# Patient Record
Sex: FEMALE | Race: WHITE
[De-identification: names, ages, dates, MRNs, and addresses within clinical notes are randomized per-mention and may not be internally consistent; named-entity substitution may affect disease eponyms.]

---

## 2017-01-18 ENCOUNTER — HOSPITAL ENCOUNTER (OUTPATIENT)
Dept: HOSPITAL 25 - OREAST | Age: 72
Discharge: HOME | End: 2017-01-18
Attending: SPECIALIST
Payer: MEDICARE

## 2017-01-18 VITALS — DIASTOLIC BLOOD PRESSURE: 56 MMHG | SYSTOLIC BLOOD PRESSURE: 139 MMHG

## 2017-01-18 DIAGNOSIS — H25.12: Primary | ICD-10-CM

## 2017-01-18 DIAGNOSIS — E78.5: ICD-10-CM

## 2017-01-18 DIAGNOSIS — I10: ICD-10-CM

## 2017-01-18 PROCEDURE — V2632 POST CHMBR INTRAOCULAR LENS: HCPCS

## 2017-01-18 NOTE — OP
OPERATIVE NOTE:

 

DATE OF OPERATION:  01/18/17.

 

DATE OF BIRTH:  [birth year].

 

SURGEON:  Kelton Mcconnell M.D.

 

PREOPERATIVE DIAGNOSIS:  Cataract, left eye.

 

POSTOPERATIVE DIAGNOSIS:  Cataract, left eye.

 

OPERATIVE PROCEDURE:  Phacoemulsification, left eye with IOL.

 

PROCEDURE:  The patient was brought to the operating room after being given 1/2
% Alcaine with epinephrine drops in the preoperative area.  The eye was prepped 
and draped in the usual sterile fashion.  Sterile drape and eyelid speculum 
were placed.  Again, topical 1/2% Alcaine with epinephrine was given.  A 
paracentesis incision was made at the 3 o'clock position with the No.75 blade.  
Clear cornea incision 2.2 x 2.2-mm was created at the 6 o'clock position 
starting at the anterior limbus using the 2.2-mm keratome.  The anterior 
chamber was irrigated with 0.4 mL of 1% non-preservative intracameral lidocaine 
and filled with DisCoVisc.  A capsulorrhexis was completed using the cystotome 
and the Utrata forceps. Hydrodissection was performed with balanced salt 
solution.  The lens nucleus was removed with the Phacoemulsification handpiece 
without incident.  Cortex was removed with the irrigation-aspiration handpiece.
  The capsular bag was re-inflated using DisCoVisc and an SN60WF 19.5 diopter 
implant was inserted with the shooter . A Malyugin ring was used to dilate the 
pupil prior to capsulorrhexis, removed after inserted of the lens.  Indication 
for complex cataract surgery _____iris_ abnormalities requiring pupil dilation 
device. The irrigation-aspiration handpiece was used to remove all residual 
DisCoVisc.  The eye was refilled with balanced salt solution and the wound 
checked and found to be watertight.  Topical Maxitrol drops were given.

 

 [id number]/CPS #: [id number]

[facility]ORINA

## 2018-10-01 NOTE — HP
HISTORY AND PHYSICAL:

 

DATE OF ADMISSION/SURGERY:  10/04/18

 

DATE OF OFFICE VISIT:  09/28/18

 

SURGEON:  Randi Fay MD * (DICTATED BY BRIANNA RIGGS)

 

PROCEDURE:  Left total knee arthroplasty.

 

CHIEF COMPLAINT:  Left knee pain.

 

HISTORY OF PRESENT ILLNESS:  Ms. Mahmood is a 73-year-old female with continued 
complaints of left knee pain.  She has failed conservative treatment and 
elected to proceed with a left total knee arthroplasty.

 

PAST MEDICAL HISTORY:  High cholesterol, GERD, and melanoma.

 

PAST SURGICAL HISTORY:  Right total knee arthroplasty, appendectomy, ileostomy 
with subsequent reversal, wisdom teeth extraction, and tonsillectomy.

 

CURRENT MEDICATIONS:

1.  Nexium 40 mg daily.

2.  TriCor 145 mg daily.

3.  Phentermine 37.5 mg daily.

 

ALLERGIES:  To IBUPROFEN, causing rash and facial swelling.

 

FAMILY HISTORY:  Denies.

 

SOCIAL HISTORY:  She is a 73-year-old female.  She lives alone.  She does not 
smoke, use drugs, or alcohol.

 

REVIEW OF SYSTEMS:  A complete 14-point review of systems was reviewed with the 
patient, was positive for GERD.  She denies history of DVT, PE, hepatitis, HIV, 
or anesthesia problems.

 

                               PHYSICAL EXAMINATION

 

GENERAL:  She is well developed, well nourished, in no acute distress.

 

VITAL SIGNS:  She stands 62 inches tall, weighs 240 pounds.  Her blood pressure 
is 124/72 and her heart rate is 76.

 

HEENT:  Normocephalic, atraumatic.

 

NECK:  Supple.  No palpable lymph nodes.

 

PULMONARY:  Lungs are clear to auscultation bilaterally.

 

CARDIO:  Regular rate rhythm.  Strong S1 and S2.

 

ABDOMEN:  Soft, nontender, nondistended.

 

NEUROLOGIC:  She is alert and oriented x3.

 

MUSCULOSKELETAL:  Left lower extremity, the skin is intact.  There are no open 
wounds or abrasions.  There is moderate joint effusion.  She has tenderness 
over the medial and lateral joint line.  She walks with an antalgic-type gait 
favoring her left knee.  Range of motion is 10 to 100 degrees of flexion with 
patellofemoral crepitus.  She has 2+ dorsalis pedis pulse, intact sensation.  
Her lower extremity muscle group strengths are intact at 5/5.

 

 ASSESSMENT AND PLAN:  Ms. Mahmood is a 73-year-old female with end-stage 
osteoarthritis to the left knee.  She has failed conservative treatment and 
elected to proceed with a left total knee arthroplasty.  The surgery is 
scheduled for 10/04/18 with Dr. Fay.  Dr. Fay has discussed the risks and 
benefits of the surgery at today's visit and all of her questions were 
answered.  She will follow up with Dr. Fay 2 weeks after the surgery.

 

 ____________________________________ BRIANNA RIGGS

 

[id number]/[facility] #: [id number]

[facility]DDORINA

## 2018-10-04 ENCOUNTER — HOSPITAL ENCOUNTER (INPATIENT)
Dept: HOSPITAL 25 - AA | Age: 73
LOS: 3 days | Discharge: HOME HEALTH | DRG: 470 | End: 2018-10-07
Attending: ORTHOPAEDIC SURGERY | Admitting: ORTHOPAEDIC SURGERY
Payer: MEDICARE

## 2018-10-04 DIAGNOSIS — E78.5: ICD-10-CM

## 2018-10-04 DIAGNOSIS — M48.061: ICD-10-CM

## 2018-10-04 DIAGNOSIS — Z88.8: ICD-10-CM

## 2018-10-04 DIAGNOSIS — Z79.01: ICD-10-CM

## 2018-10-04 DIAGNOSIS — M54.16: ICD-10-CM

## 2018-10-04 DIAGNOSIS — K21.9: ICD-10-CM

## 2018-10-04 DIAGNOSIS — M25.762: ICD-10-CM

## 2018-10-04 DIAGNOSIS — Z96.651: ICD-10-CM

## 2018-10-04 DIAGNOSIS — R26.89: ICD-10-CM

## 2018-10-04 DIAGNOSIS — Z93.2: ICD-10-CM

## 2018-10-04 DIAGNOSIS — E78.00: ICD-10-CM

## 2018-10-04 DIAGNOSIS — Z23: ICD-10-CM

## 2018-10-04 DIAGNOSIS — Z87.891: ICD-10-CM

## 2018-10-04 DIAGNOSIS — M85.862: ICD-10-CM

## 2018-10-04 DIAGNOSIS — M25.462: ICD-10-CM

## 2018-10-04 DIAGNOSIS — J45.909: ICD-10-CM

## 2018-10-04 DIAGNOSIS — E78.2: ICD-10-CM

## 2018-10-04 DIAGNOSIS — E66.9: ICD-10-CM

## 2018-10-04 DIAGNOSIS — Z85.820: ICD-10-CM

## 2018-10-04 DIAGNOSIS — Z91.81: ICD-10-CM

## 2018-10-04 DIAGNOSIS — M19.042: ICD-10-CM

## 2018-10-04 DIAGNOSIS — M17.12: Primary | ICD-10-CM

## 2018-10-04 PROCEDURE — 0SRD0J9 REPLACEMENT OF LEFT KNEE JOINT WITH SYNTHETIC SUBSTITUTE, CEMENTED, OPEN APPROACH: ICD-10-PCS | Performed by: ORTHOPAEDIC SURGERY

## 2018-10-04 PROCEDURE — 85049 AUTOMATED PLATELET COUNT: CPT

## 2018-10-04 PROCEDURE — 80048 BASIC METABOLIC PNL TOTAL CA: CPT

## 2018-10-04 PROCEDURE — 36415 COLL VENOUS BLD VENIPUNCTURE: CPT

## 2018-10-04 PROCEDURE — C1776 JOINT DEVICE (IMPLANTABLE): HCPCS

## 2018-10-04 PROCEDURE — 85610 PROTHROMBIN TIME: CPT

## 2018-10-04 PROCEDURE — 87641 MR-STAPH DNA AMP PROBE: CPT

## 2018-10-04 PROCEDURE — 85014 HEMATOCRIT: CPT

## 2018-10-04 PROCEDURE — 85018 HEMOGLOBIN: CPT

## 2018-10-04 PROCEDURE — 90686 IIV4 VACC NO PRSV 0.5 ML IM: CPT

## 2018-10-04 RX ADMIN — CEFAZOLIN SODIUM SCH MLS/HR: 1 SOLUTION INTRAVENOUS at 22:00

## 2018-10-04 RX ADMIN — TRAMADOL HYDROCHLORIDE PRN MG: 50 TABLET, FILM COATED ORAL at 23:25

## 2018-10-04 RX ADMIN — OXYCODONE HYDROCHLORIDE AND ACETAMINOPHEN PRN TAB: 5; 325 TABLET ORAL at 17:07

## 2018-10-04 RX ADMIN — FENTANYL CITRATE PRN MCG: 0.05 INJECTION, SOLUTION INTRAMUSCULAR; INTRAVENOUS at 10:26

## 2018-10-04 RX ADMIN — MORPHINE SULFATE PRN MG: 4 INJECTION INTRAVENOUS at 23:22

## 2018-10-04 RX ADMIN — CEFAZOLIN SODIUM SCH MLS/HR: 1 SOLUTION INTRAVENOUS at 22:05

## 2018-10-04 RX ADMIN — OXYCODONE HYDROCHLORIDE AND ACETAMINOPHEN PRN TAB: 5; 325 TABLET ORAL at 22:08

## 2018-10-04 RX ADMIN — OXYCODONE HYDROCHLORIDE PRN MG: 5 CAPSULE ORAL at 20:12

## 2018-10-04 RX ADMIN — MAGNESIUM HYDROXIDE SCH ML: 400 SUSPENSION ORAL at 22:08

## 2018-10-04 RX ADMIN — CEFAZOLIN SODIUM SCH MLS/HR: 1 SOLUTION INTRAVENOUS at 22:15

## 2018-10-04 RX ADMIN — FENTANYL CITRATE PRN MCG: 0.05 INJECTION, SOLUTION INTRAMUSCULAR; INTRAVENOUS at 10:35

## 2018-10-04 RX ADMIN — OXYCODONE HYDROCHLORIDE AND ACETAMINOPHEN PRN TAB: 5; 325 TABLET ORAL at 16:24

## 2018-10-04 RX ADMIN — CEFAZOLIN SODIUM SCH: 1 SOLUTION INTRAVENOUS at 22:15

## 2018-10-04 RX ADMIN — ACETAMINOPHEN SCH: 325 TABLET ORAL at 22:49

## 2018-10-04 RX ADMIN — DOCUSATE SODIUM SCH MG: 100 CAPSULE, LIQUID FILLED ORAL at 22:08

## 2018-10-04 NOTE — XMS REPORT
Continuity of Care Document (CCD)

 Created on:2018



Patient:Zoraida Mahmood

Sex:Female

:1945

External Reference #:[id number]





Demographics







 Address  [address] [place] [address]

 

 Home Phone  [phone]

 

 Mobile Phone  [phone]

 

 Work Phone  [phone]

 

 Preferred Language  en

 

 Marital Status  Not  or 

 

 Latter-day Affiliation  Unknown

 

 Race  White

 

 Ethnic Group  Not  or 









Author







 Name  Kelton Mcconnell M.D.

 

 Address  [address] [place] [address]









Support







 Name  Relationship  Address  Phone

 

 Carlos Healy  Grandchild  Unavailable  [phone]









Care Team Providers







 Name  Role  Phone

 

 Sheryl Leigh M.D.  Primary Care Physician  Unavailable









Payers







 Type  Date  Identification Numbers  Payment Provider  Subscriber

 

     Policy Number: [id number]  Medicare - [facility]  Zoraida Mahmood









 PayID: [id number]  [address]









 [place] [address]









     Policy Number: [id number]  [facility]  Zoraida Mahmood









 Group Number: [id number]  [id number]

 

 PayID: [id number]  [place] [address]







Advance Directives







 Description

 

 No Information Available







Problems







 Date  Description  Provider  Status

 

 Onset:   Gastroesophageal reflux disease    Active

 

 Onset:   Low blood pressure    Active

 

 Onset:   Arthritis    Active

 

 Onset: 2018  Other secondary cataract, left eye  Kelton Mcconnell M.D.  
Active

 

 Onset: 07/10/2015  Pseudophakia  Kelton Mcconnell M.D.  Active

 

 Onset: 07/10/2015  Vitreous degeneration  Kelton Mcconnell M.D.  Active

 

 Onset: 07/10/2015  Retinal edema  Kelton Mcconnell M.D.  Active

 

 Onset: 07/10/2015  Nuclear senile cataract  Kelton Mcconnell M.D.  Active







Family History







 Date  Family Member(s)  Problem(s)  Comments

 

   Father  No Current Problems  

 

   Mother  No Current Problems  







Social History







 Type  Date  Description  Comments

 

 Birth Sex    Unknown  

 

 Marital Status    Legal Status:   

 

 Occupation       and



       Construction for [employer]

 

 Work Status    Retired  

 

 ETOH Use    Occasionally consumes  



     alcohol  

 

 Tobacco Use  Start: Unknown  Patient has never  



     smoked  

 

 Recreational Drug Use    Denies Drug Use  

 

 Smoking Status  Reviewed: 18  Patient has never  



     smoked  







Allergies, Adverse Reactions, Alerts







 Date  Description  Reaction  Status  Severity  Comments

 

 07/10/2015  Ibuprofen  Allergic asthma, Urticaria  Active    







Medications







 Medication  Date  Status  Form  Strength  Qnty  SIG  Indications  Ordering



                 Provider

 

 Phentermine HCL  /  Active  Tablets  37.5mg        Crepet,



   0000              Sheryl M.D.

 

 Hydrocodone-Ace  /  Active  Tablets  7.5-325mg        Crepet,



 taminophen  0000              Sheryl M.D.

 

 Fenofibrate  /  Active  Tablets  145mg        Unknown



   0000              

 

 Nexium  /  Active  Capsules DR  40mg        Unknown



   0000              

 

                 

 

 Ciprofloxacin  /  Hx  Solution  0.2%  14uni  1 drop    Kelton J.



 HCL   -        ts  left eye    Arleo,



   /          three    M.D.



   2018          times a    



             day    

 

 Prednisolone  /  Hx  Suspension  1%  15uni  1 drops    Kelton J.



 Acetate   -          left eye    Arleo,



             three    M.D.



   2018          times a    



             day.    



             taper as    



             directed    







Immunizations







 Description

 

 No Information Available







Vital Signs







 Description

 

 No Information Available







Results







 Description

 

 No Information Available







Procedures







 Date  Code  Description  Status

 

 2017  47423  Extracapsular Cataract Extraction W/Intraocular Lens  
Completed

 

 2017  15630  Ophthalmic Biometry  Completed

 

 2016  71903  Est Patient Comprehensive Exam  Completed

 

 07/10/2015  00946  Est Patient Comprehensive Exam  Completed

 

 2014  99358  Est Patient Comprehensive Exam  Completed

 

 11/15/2011  05591  Determination Of Refractive State  Completed

 

 11/15/2011  74548  Est Patient Comprehensive Exam  Completed







Encounters







 Type  Date  Location  Provider  Dx  Diagnosis

 

 Office Visit  2017  Kelton Turner,  H25.12  Age-
related



   10:15a  MD, renee SIMPSON    nuclear cataract,



           left eye









 Z96.1  Presence of intraocular lens







Plan of Treatment

2018 - Kelton Mcconnell M.D.H26.492 Other secondary cataract, left 
eyeComments:Smoking can increase the risk of developing or worsening any eye 
related disease, as well as affect your overall health.  If you are a smoker, 
we strongly recommend that you quit.If you are not a smoker, we strongly 
recommend that you do not start.  There is clouding in the sac that holds your 
artificial lens in your right eye. We will schedule you an appointment for the 
YAG Capsulotomy laser with Dr. Mcconnell.  Please read the pamphlet that has been 
printed out for you.  We recommend you bring someoneto drive you home.Follow up:
Schedule YAG OD  FIRST THING THURSDAY AMZ96.1 Presence of intraocular 
lensComments:The artificial lens implants in both eyes appear to be stable at 
this time.H35.81 Retinal edema

## 2018-10-04 NOTE — XMS REPORT
Zoraida Olivarez

 Created on:2018



Patient:Zoraida Olivarez

Sex:Female

:1945

External Reference #:[id number]





Demographics







 Address  [address] [place] [address]

 

 Home Phone  [phone]

 

 Mobile Phone  [phone]

 

 Email Address  [email or web address]

 

 Preferred Language  English

 

 Marital Status  Not  Or 

 

 [employer] Affiliation  Unknown

 

 Race  White

 

 Ethnic Group  Not  Or 









Author







 Organization  [facility]

 

 Address  [address]



   [place] [address]

 

 Phone  [phone]









Support







 Name  Relationship  Address  Phone

 

 Carlos Healy  Unavailable  Unavailable  [phone]









Care Team Providers







 Name  Role  Phone

 

 Yuan Henley MD  Care Team Information   Unavailable

 

 Sheryl Leigh MD  Primary Care Physician  Unavailable









Payers







 Type  Date  Identification Numbers  Payment Provider  Subscriber

 

 Medicare Primary    Policy Number: [id number]  Medicare  Zoraida Timoteo









 PayID: [id number]  [address]









 [place] [address]









 Medigap Part B    Policy Number: [id number]  [employer]  Zoraida Timoteo









 PayID: [id number]  [address]









 [place] [address]









 Medigap Part B  Expires: 2018  Policy Number: [id number]  [facility]  Zoraida Timoteo









 PayID: [id number]  [address]









 [place] [address]







Problems







 Date  Description  Provider  Status

 

 Onset: 2018  Localized, primary osteoarthritis  Randi Fay M.D.  
Active







Family History







 Date  Family Member(s)  Problem(s)  Comments

 

   General  No Current Problems  







Social History







 Type  Date  Description  Comments

 

 Lives With    Alone  

 

 Occupation    Retired  

 

 ETOH Use    Rarely consumes alcohol  

 

 Smoking    Patient is a former smoker  quit 

 

 Exercise Type/Frequency    Exercises sporadically  







Allergies, Adverse Reactions, Alerts







 Date  Description  Reaction  Status  Severity  Comments

 

 2017  Ibuprofen    active    







Medications







 Medication  Date  Status  Form  Strength  Qnty  SIG  Indications  Ordering



                 Provider

 

 Nexium    Active  Capsules DR  40mg    1 by    Unknown



   000          mouth    



             every day    

 

 Tricor    Active  Tablets  145mg    1 by    Unknown



   000          mouth    



             every day    

 

 Phentermine  00/00/0  Active  Tablets  37.5mg        Crepet,



 HCL  000              MD Sheryl

 

                 

 

 Hydrocodone  0  Hx  Tablets  7.5-300mg    1 tab by    Unknown



 Bitartrate/Ace  000 -          mouth    



 taminophen            every    



   018          8-12    



             hours as    



             needed    







Medications Administered in Office







 Medication  Date  Status  Form  Strength  Qnty  SIG  Indications  Ordering



                 Provider

 

 Depomedrol    Administered  Injection          Alethea



 40MG  Bianca Santos M.D.







Vital Signs







 Date  Vital  Result  Comment

 

 2018  Height  62.5 inches  5'2.50"









 Weight  242.00 lb  

 

 BP Systolic  127 mmHg  

 

 BP Diastolic  74 mmHg  

 

 Respiratory Rate  16 /min  

 

 Pain Level  7  

 

 BMI (Body Mass Index)  43.6 kg/m2  









 2017  Height  62.5 inches  5'2.50"









 Weight  226.00 lb  

 

 Heart Rate  80 /min  

 

 BP Systolic Sitting  142 mmHg  

 

 BP Diastolic Sitting  70 mmHg  

 

 Respiratory Rate  14 /min  

 

 Body Temperature  98.2 F  

 

 Pain Level  8  

 

 BMI (Body Mass Index)  40.7 kg/m2  







Results







 Test  Date  Test  Result  H/L  Range  Note

 

 CBC Auto Diff  2018  White Blood Count  7.8 10^3/uL    3.5-10.8  









 Red Blood Count  4.05 10^6/uL    4.00-5.40  

 

 Hemoglobin  13.3 g/dL    12.0-16.0  

 

 Hematocrit  40 %    35-47  

 

 Mean Corpuscular Volume  98 fL  High  80-97  

 

 Mean Corpuscular Hemoglobin  33 pg  High  27-31  

 

 Mean Corpuscular HGB Conc  34 g/dL    31-36  

 

 Red Cell Distribution Width  13 %    10.5-15  

 

 Platelet Count  269 10^3/uL    150-450  

 

 Mean Platelet Volume  8.2 um3    7.4-10.4  

 

 Abs Neutrophils  5.1 10^3/uL    1.5-7.7  

 

 Abs Lymphocytes  2.0 10^3/uL    1.0-4.8  

 

 Abs Monocytes  0.5 10^3/uL    0-0.8  

 

 Abs Eosinophils  0.2 10^3/uL    0-0.6  

 

 Abs Basophils  0.1 10^3/uL    0-0.2  

 

 Abs Nucleated RBC  0 10^3/uL      

 

 Granulocyte %  65.1 %    38-83  

 

 Lymphocyte %  25.4 %    25-47  

 

 Monocyte %  6.6 %    0-7  

 

 Eosinophil %  2.0 %    0-6  

 

 Basophil %  0.9 %    0-2  

 

 Nucleated Red Blood Cells %  0.1      









 Inr/Protime  2018  Inr  0.94    0.77-1.02  

 

 Laboratory test finding  2018  Partial Thrombo Time  31.9 seconds    26.0
-36.3  



     PTT        

 

 Comp Metabolic Panel  2018  Sodium  139 mmol/L    135-145  









 Potassium  3.9 mmol/L    3.5-5.0  

 

 Chloride  103 mmol/L    101-111  

 

 Co2 Carbon Dioxide  28 mmol/L    22-32  

 

 Anion Gap  8 mmol/L    2-11  

 

 Glucose  116 mg/dL  High    

 

 Blood Urea Nitrogen  26 mg/dL  High  6-24  

 

 Creatinine  1.03 mg/dL  High  0.51-0.95  

 

 BUN/Creatinine Ratio  25.2  High  8-20  

 

 Calcium  9.6 mg/dL    8.6-10.3  

 

 Total Protein  6.7 g/dL    6.4-8.9  

 

 Albumin  3.8 g/dL    3.2-5.2  

 

 Globulin  2.9 g/dL    2-4  

 

 Albumin/Globulin Ratio  1.3    1-3  

 

 Total Bilirubin  0.40 mg/dL    0.2-1.0  

 

 Alkaline Phosphatase  57 U/L      

 

 Alt  15 U/L    7-52  

 

 Ast  18 U/L    13-39  

 

 Egfr Non-  52.4    >60  

 

 Egfr   63.4    >60  1









 Type & Screen  2018  Patient Blood Type  B Positive      









 Antibody Screen  NEGATIVE      









 Urinalysis Profile  2018  Urine Color  Yellow      









 Urine Appearance  Clear      

 

 Urine Specific Gravity  1.035  High  1.010-1.030  

 

 Urine pH  5    5-9  

 

 Urine Urobilinogen  Negative    Negative  

 

 Urine Ketones  Negative    Negative  

 

 Urine Protein  Negative    Negative  

 

 Urine Leukocytes  2+    Negative  

 

 Urine Blood  Negative    Negative  

 

 Urine Nitrite  Negative    Negative  

 

 Urine Bilirubin  Negative    Negative  

 

 Urine Glucose  Negative    Negative  

 

 Urine White Blood Cell  3+(>20/hpf)    Absent  

 

 Urine Red Blood Cell  2+(6-10/hpf)    Absent  

 

 Urine Bacteria  Absent    Absent  

 

 Urine Squamous Epithelial Cell  Present    Absent  









 Urine Culture And Sensitivities  2018  Urine Culture  SEE RESULT BELOW  
    2









 1  *******Because ethnic data is not always readily available,



   this report includes an eGFR for both -Americans and



   non- Americans.****



   The National Kidney Disease Education Program (NKDEP) does



   not endorse the use of the MDRD equation for patients that



   are not between the ages of 18 and 70, are pregnant, have



   extremes of body size, muscle mass, or nutritional status,



   or are non- or non-.



   According to the National Kidney Foundation, irrespective of



   diagnosis, the stage of the disease is based on the level of



   kidney function:



   Stage Description                      GFR(mL/min/1.73 m(2))



   1     Kidney damage with normal or decreased GFR       90



   2     Kidney damage with mild decrease in GFR          60-89



   3     Moderate decrease in GFR                         30-59



   4     Severe decrease in GFR                           15-29



   5     Kidney failure                       <15 (or dialysis)

 

 2  SEE RESULT BELOW



   -----------------------------------------------------------------------------
---------------



   Name:  ZORAIDA OLIVAREZ                     : 1945    Attend Dr: Randi Fay MD



   Acct:  [id number]  Unit: [id number]  AGE: 73            Location:  [facility]



   Re18                        SEX: F             Status:    REG REF



   -----------------------------------------------------------------------------
---------------



   



   SPEC: [id number]         ARSALAN:       [facility] DR: Randi Fay MD



   REQ:  [id number]              RECD:   



   STATUS: COMP



   _



   SOURCE: URINE          SPDESC:



   ORDERED:  Urine Culture



   QUERIES:  Urine Source: Clean Catch



   



   -----------------------------------------------------------------------------
---------------



   Procedure                         Result                         Reported   
        Site



   -----------------------------------------------------------------------------
---------------



   Urine Culture  Final                                             18-
1630      ML



   



   No growth of clinically significant organisms



   



   -----------------------------------------------------------------------------
---------------



   * ML - Main Lab



   .



   



   



   



   



   



   



   



   



   



   



   



   



   



   



   



   



   



   



   



   



   



   



   



   



   



   ** END OF REPORT **



   



   DEPARTMENT OF PATHOLOGY,  [address] [place] [address]



   Phone # [phone]      Fax #[phone]



   Ramón Troy M.D. Director     [facility] # [id number]







Procedures







 Date  CPT Code  Description  Status

 

 2017  06438  Inject/Drain Joint/Bursa Small W/O US  Completed







Encounters







 Type  Date  Location  Provider  CPT E/M  Dx

 

 Office Visit  2018  [facility]  Randi Fay M.D.  [id number]  
M25.562



   10:15a  [facility] [place]      









 M25.462

 

 M17.12









 Office Visit  2017  9:30a  [facility]  Alethea Santos,  [id number]  
M18.12



     [facility] [place]    







Plan of Care

Future Appointment(s):2018  9:00 am - Randi F[facility] M.D. [facility]  3:45 pm - Henry Daniels PA-C [facility]  3:45 pm - BRIANNA Carvajal at [facility]2018  3:45 pm - Randi Fay M.D. at [facility] [place] - Randi Fay M.D.M25.562 Pain in left 
kneeFollow up:Follow up:   7-10 days before ewyigjaX02.462 Effusion, left 
kneeM17.12 Unilateral primary osteoarthritis, left knee

## 2018-10-04 NOTE — RAD
HISTORY: s/p left TKA



COMPARISONS: September 07, 2018 



VIEWS: 3 , Frontal and lateral views of the left knee 



FINDINGS:



BONE DENSITY: Normal.

BONES: The patient is status post left knee  arthroplasty. There is no hardware failure or

osteolysis.

JOINTS: The patient is status post left knee arthroplasty. 

ALIGNMENT: There is no dislocation. 

SOFT TISSUES: There is postsurgical change to the soft tissues. 



OTHER FINDINGS: None.



IMPRESSION: 

STATUS POST LEFT KNEE ARTHROPLASTY

## 2018-10-04 NOTE — XMS REPORT
Zoraida Olivarez

 Created on:2018



Patient:Zoraida Olivarez

Sex:Female

:1945

External Reference #:[id number]





Demographics







 Address  [address] [place] [address]

 

 Home Phone  [phone]

 

 Mobile Phone  [phone]

 

 Email Address  [email or web address]

 

 Preferred Language  English

 

 Marital Status  Not  Or 

 

 Advent Affiliation  Unknown

 

 Race  White

 

 Ethnic Group  Not  Or 









Author







 Organization  [employer]

 

 Address  [address]



   [place] [address]

 

 Phone  [phone]









Support







 Name  Relationship  Address  Phone

 

 Carlos Healy  Unavailable  Unavailable  [phone]









Care Team Providers







 Name  Role  Phone

 

 Yuan Henley MD  Care Team Information   Unavailable

 

 Sheryl Leigh MD  Primary Care Physician  Unavailable









Payers







 Type  Date  Identification Numbers  Payment Provider  Subscriber

 

 Medicare Primary    Policy Number: [id number]  Medicare  Zoraida Timoteo









 PayID: [id number]  [address]









 [place] [address]









 Medigap Part B    Policy Number: [id number]  [employer]  Zoraida Timoteo









 PayID: [id number]  [address]









 [place] [address]









 Medigap Part B  Expires: 2018  Policy Number: [id number]  [facility]  Zoraida Timoteo









 PayID: [id number]  [address]









 [place] [address]







Problems







 Date  Description  Provider  Status

 

 Onset: 2018  Localized, primary osteoarthritis  Randi Fay M.D.  
Active







Family History







 Date  Family Member(s)  Problem(s)  Comments

 

   General  No Current Problems  







Social History







 Type  Date  Description  Comments

 

 Lives With    Alone  

 

 Occupation    Retired  

 

 ETOH Use    Rarely consumes alcohol  

 

 Smoking    Patient is a former smoker  quit 

 

 Exercise Type/Frequency    Exercises sporadically  







Allergies, Adverse Reactions, Alerts







 Date  Description  Reaction  Status  Severity  Comments

 

 2017  Ibuprofen    active    

 

 2018  Strawberries    active    







Medications







 Medication  Date  Status  Form  Strength  Qnty  SIG  Indications  Ordering



                 Provider

 

 Nexium    Active  Capsules DR  40mg    1 by    Unknown



   000          mouth    



             every day    

 

 Tricor    Active  Tablets  145mg    1 by    Unknown



   000          mouth    



             every day    

 

 Phentermine  0  Active  Tablets  37.5mg        Crepet,



 HCL  000              MD Sheryl

 

                 

 

 Hydrocodone    Hx  Tablets  7.5-300mg    1 tab by    Unknown



 Bitartrate/Ace  000 -          mouth    



 taminophen            every    



   018          8-12    



             hours as    



             needed    







Medications Administered in Office







 Medication  Date  Status  Form  Strength  Qnty  SIG  Indications  Ordering



                 Provider

 

 Depomedrol    Administered  Injection          Alethea



 40MG  Bianca Santos M.D.







Vital Signs







 Date  Vital  Result  Comment

 

 2018  Height  62.5 inches  5'2.50"









 Weight  241.00 lb  

 

 BP Systolic  124 mmHg  

 

 BP Diastolic  72 mmHg  

 

 Respiratory Rate  15 /min  

 

 Pain Level  6  

 

 BMI (Body Mass Index)  43.4 kg/m2  









 2018  Height  62.5 inches  5'2.50"









 Weight  242.00 lb  

 

 BP Systolic  127 mmHg  

 

 BP Diastolic  74 mmHg  

 

 Respiratory Rate  16 /min  

 

 Pain Level  7  

 

 BMI (Body Mass Index)  43.6 kg/m2  









 2017  Height  62.5 inches  5'2.50"









 Weight  226.00 lb  

 

 Heart Rate  80 /min  

 

 BP Systolic Sitting  142 mmHg  

 

 BP Diastolic Sitting  70 mmHg  

 

 Respiratory Rate  14 /min  

 

 Body Temperature  98.2 F  

 

 Pain Level  8  

 

 BMI (Body Mass Index)  40.7 kg/m2  







Results







 Test  Date  Test  Result  H/L  Range  Note

 

 CBC Auto Diff  2018  White Blood Count  7.8 10^3/uL    3.5-10.8  









 Red Blood Count  4.05 10^6/uL    4.00-5.40  

 

 Hemoglobin  13.3 g/dL    12.0-16.0  

 

 Hematocrit  40 %    35-47  

 

 Mean Corpuscular Volume  98 fL  High  80-97  

 

 Mean Corpuscular Hemoglobin  33 pg  High  27-31  

 

 Mean Corpuscular HGB Conc  34 g/dL    31-36  

 

 Red Cell Distribution Width  13 %    10.5-15  

 

 Platelet Count  269 10^3/uL    150-450  

 

 Mean Platelet Volume  8.2 um3    7.4-10.4  

 

 Abs Neutrophils  5.1 10^3/uL    1.5-7.7  

 

 Abs Lymphocytes  2.0 10^3/uL    1.0-4.8  

 

 Abs Monocytes  0.5 10^3/uL    0-0.8  

 

 Abs Eosinophils  0.2 10^3/uL    0-0.6  

 

 Abs Basophils  0.1 10^3/uL    0-0.2  

 

 Abs Nucleated RBC  0 10^3/uL      

 

 Granulocyte %  65.1 %    38-83  

 

 Lymphocyte %  25.4 %    25-47  

 

 Monocyte %  6.6 %    0-7  

 

 Eosinophil %  2.0 %    0-6  

 

 Basophil %  0.9 %    0-2  

 

 Nucleated Red Blood Cells %  0.1      









 Inr/Protime  2018  Inr  0.94    0.77-1.02  

 

 Laboratory test finding  2018  Partial Thrombo Time  31.9 seconds    26.0
-36.3  



     PTT        

 

 Comp Metabolic Panel  2018  Sodium  139 mmol/L    135-145  









 Potassium  3.9 mmol/L    3.5-5.0  

 

 Chloride  103 mmol/L    101-111  

 

 Co2 Carbon Dioxide  28 mmol/L    22-32  

 

 Anion Gap  8 mmol/L    2-11  

 

 Glucose  116 mg/dL  High    

 

 Blood Urea Nitrogen  26 mg/dL  High  6-24  

 

 Creatinine  1.03 mg/dL  High  0.51-0.95  

 

 BUN/Creatinine Ratio  25.2  High  8-20  

 

 Calcium  9.6 mg/dL    8.6-10.3  

 

 Total Protein  6.7 g/dL    6.4-8.9  

 

 Albumin  3.8 g/dL    3.2-5.2  

 

 Globulin  2.9 g/dL    2-4  

 

 Albumin/Globulin Ratio  1.3    1-3  

 

 Total Bilirubin  0.40 mg/dL    0.2-1.0  

 

 Alkaline Phosphatase  57 U/L      

 

 Alt  15 U/L    7-52  

 

 Ast  18 U/L    13-39  

 

 Egfr Non-  52.4    >60  

 

 Egfr   63.4    >60  1









 Type & Screen  2018  Patient Blood Type  B Positive      









 Antibody Screen  NEGATIVE      









 Urinalysis Profile  2018  Urine Color  Yellow      









 Urine Appearance  Clear      

 

 Urine Specific Gravity  1.035  High  1.010-1.030  

 

 Urine pH  5    5-9  

 

 Urine Urobilinogen  Negative    Negative  

 

 Urine Ketones  Negative    Negative  

 

 Urine Protein  Negative    Negative  

 

 Urine Leukocytes  2+    Negative  

 

 Urine Blood  Negative    Negative  

 

 Urine Nitrite  Negative    Negative  

 

 Urine Bilirubin  Negative    Negative  

 

 Urine Glucose  Negative    Negative  

 

 Urine White Blood Cell  3+(>20/hpf)    Absent  

 

 Urine Red Blood Cell  2+(6-10/hpf)    Absent  

 

 Urine Bacteria  Absent    Absent  

 

 Urine Squamous Epithelial Cell  Present    Absent  









 Urine Culture And Sensitivities  2018  Urine Culture  SEE RESULT BELOW  
    2









 1  *******Because ethnic data is not always readily available,



   this report includes an eGFR for both -Americans and



   non- Americans.****



   The National Kidney Disease Education Program (NKDEP) does



   not endorse the use of the MDRD equation for patients that



   are not between the ages of 18 and 70, are pregnant, have



   extremes of body size, muscle mass, or nutritional status,



   or are non- or non-.



   According to the National Kidney Foundation, irrespective of



   diagnosis, the stage of the disease is based on the level of



   kidney function:



   Stage Description                      GFR(mL/min/1.73 m(2))



   1     Kidney damage with normal or decreased GFR       90



   2     Kidney damage with mild decrease in GFR          60-89



   3     Moderate decrease in GFR                         30-59



   4     Severe decrease in GFR                           15-29



   5     Kidney failure                       <15 (or dialysis)

 

 2  SEE RESULT BELOW



   -----------------------------------------------------------------------------
---------------



   Name:  ZORAIDA OLIVRAEZ                     : 1945    Attend Dr: Randi Fay MD



   Acct:  [id number]  Unit: [id number]  AGE: 73            Location:  [facility]



   Re18                        SEX: F             Status:    REG REF



   -----------------------------------------------------------------------------
---------------



   



   SPEC: [id number]         ARSALAN:       SUBM DR: Rnadi Fay MD



   REQ:  [id number]              RECD:   



   STATUS: COMP



   _



   SOURCE: URINE          SPDESC:



   ORDERED:  Urine Culture



   QUERIES:  Urine Source: Clean Catch



   



   -----------------------------------------------------------------------------
---------------



   Procedure                         Result                         Reported   
        Site



   -----------------------------------------------------------------------------
---------------



   Urine Culture  Final                                             18-
1630      ML



   



   No growth of clinically significant organisms



   



   -----------------------------------------------------------------------------
---------------



   * ML - Main Lab



   .



   



   



   



   



   



   



   



   



   



   



   



   



   



   



   



   



   



   



   



   



   



   



   



   



   



   ** END OF REPORT **



   



   DEPARTMENT OF PATHOLOGY,  [address] [place] [address]



   Phone # [phone]      Fax #[phone]



   Ramón Troy M.D. Director     [facility] # [id number]







Procedures







 Date  CPT Code  Description  Status

 

 2017  29694  Inject/Drain Joint/Bursa Small W/O US  Completed







Encounters







 Type  Date  Location  Provider  CPT E/M  Dx

 

 Office Visit  2018  [facility]  Randi Fay M.D.  [id number]  
M25.562



   10:15a  [facility] [place]      









 M25.462

 

 M17.12









 Office Visit  2017  9:30a  [facility]  Alethea Santos,  [address]  
M18.12



     [facility] [place]    







Plan of Care

Future Appointment(s):10/04/2018 11:45 am - Henry Daniels PA-C at [facility] [place]/04/2018 11:45 am - BRIANNA Carvajal at [facility].10/04/2018 11:45 am - Randi Fay M.D. at [facility]2018 - Randi Fay M.D.M25.562 Pain in left 
kneeFollow up:Follow up:   2 weeks after nvuqrttP22.462 Effusion, left 
kneeM17.12 Unilateral primary osteoarthritis, left knee

## 2018-10-04 NOTE — XMS REPORT
Zoraidaelvin Mahmood

 Created on:2018



Patient:Zoraida Mahmood

Sex:Female

:1945

External Reference #:[id number]





Demographics







 Address  [address] [place] [address]

 

 Home Phone  [phone]

 

 Mobile Phone  [phone]

 

 Email Address  [email or web address]

 

 Preferred Language  English

 

 Marital Status  Not  Or 

 

 Rastafari Affiliation  Unknown

 

 Race  White

 

 Ethnic Group  Not  Or 









Author







 Organization  [email or web address]

 

 Address  [address]



   [place] [address]

 

 Phone  [phone]









Support







 Name  Relationship  Address  Phone

 

 Carlos Healy  Unavailable  Unavailable  [phone]









Care Team Providers







 Name  Role  Phone

 

 Yuan Henley MD  Care Team Information   Unavailable

 

 Sheryl Leigh MD  Primary Care Physician  Unavailable









Payers







 Type  Date  Identification Numbers  Payment Provider  Subscriber

 

 Medicare Primary    Policy Number: [id number]  Medicare  Zoraida Timoteo









 PayID: [id number]   [address]









 [place] [address]









 [facility]    Policy Number: [id number]  [facility]  Zoraida Timoteo









 PayID: [id number]  [address]









 [place] [address]







Problems







 Date  Description  Provider  Status

 

 Onset: 2018  Localized, primary osteoarthritis  Randi Fay M.D.  
Active







Family History







 Date  Family Member(s)  Problem(s)  Comments

 

   General  No Current Problems  







Social History







 Type  Date  Description  Comments

 

 Lives With    Alone  

 

 Occupation    Retired  

 

 ETOH Use    Rarely consumes alcohol  

 

 Smoking    Patient is a former smoker  quit 

 

 Exercise Type/Frequency    Exercises sporadically  







Allergies, Adverse Reactions, Alerts







 Date  Description  Reaction  Status  Severity  Comments

 

 2017  Ibuprofen    active    







Medications







 Medication  Date  Status  Form  Strength  Qnty  SIG  Indications  Ordering



                 Provider

 

 Nexium    Active  Capsules DR  40mg    1 by    Unknown



   000          mouth    



             every day    

 

 Tricor  0  Active  Tablets  145mg    1 by    Unknown



   000          mouth    



             every day    

 

 Phentermine    Active  Tablets  37.5mg        Crepet,



 HCL  000              MD Sheryl

 

                 

 

 Hydrocodone    Hx  Tablets  7.5-300mg    1 tab by    Unknown



 Bitartrate/Ace  000 -          mouth    



 taminophen            every    



   018          8-12    



             hours as    



             needed    







Medications Administered in Office







 Medication  Date  Status  Form  Strength  Qnty  SIG  Indications  Ordering



                 Provider

 

 Depomedrol    Administered  Injection          Alethea



 40MG  Bianca Santos M.D.







Vital Signs







 Date  Vital  Result  Comment

 

 2018  Height  62.5 inches  5'2.50"









 Weight  242.00 lb  

 

 BP Systolic  127 mmHg  

 

 BP Diastolic  74 mmHg  

 

 Respiratory Rate  16 /min  

 

 Pain Level  7  

 

 BMI (Body Mass Index)  43.6 kg/m2  









 2017  Height  62.5 inches  5'2.50"









 Weight  226.00 lb  

 

 Heart Rate  80 /min  

 

 BP Systolic Sitting  142 mmHg  

 

 BP Diastolic Sitting  70 mmHg  

 

 Respiratory Rate  14 /min  

 

 Body Temperature  98.2 F  

 

 Pain Level  8  

 

 BMI (Body Mass Index)  40.7 kg/m2  







Results







 Description

 

 No Information







Procedures







 Date  CPT Code  Description  Status

 

 2017  49975  Inject/Drain Joint/Bursa Small W/O US  Completed







Encounters







 Type  Date  Location  Provider  CPT E/M  Dx

 

 Office Visit  2017  [facility]  Alethea Santos,  [address]  M18.12



   9:30a  [facility] [place]    







Plan of Care

Future Appointment(s):10/04/2018  3:30 pm - Randi Fay M.D. at [facility] [email or web address]  1:30 pm - Randi Fay M.D. at [facility] - Randi Fay M.D.M25.562 Pain in left 
kneeFollow up:Follow up:   7-10 days before cflcthwB53.462 Effusion, left 
kneeM17.12 Unilateral primary osteoarthritis, left knee

## 2018-10-05 LAB
HCT VFR BLD AUTO: 35 % (ref 35–47)
HGB BLD-MCNC: 12.2 G/DL (ref 12–16)
INR PPP/BLD: 1.09 (ref 0.77–1.02)
PLATELET # BLD AUTO: 264 10^3/UL (ref 150–450)

## 2018-10-05 RX ADMIN — DOCUSATE SODIUM SCH MG: 100 CAPSULE, LIQUID FILLED ORAL at 19:35

## 2018-10-05 RX ADMIN — OXYCODONE HYDROCHLORIDE AND ACETAMINOPHEN PRN TAB: 5; 325 TABLET ORAL at 07:07

## 2018-10-05 RX ADMIN — OXYCODONE HYDROCHLORIDE AND ACETAMINOPHEN PRN TAB: 5; 325 TABLET ORAL at 02:09

## 2018-10-05 RX ADMIN — OXYCODONE HYDROCHLORIDE AND ACETAMINOPHEN PRN TAB: 5; 325 TABLET ORAL at 23:02

## 2018-10-05 RX ADMIN — MAGNESIUM HYDROXIDE SCH ML: 400 SUSPENSION ORAL at 10:44

## 2018-10-05 RX ADMIN — OXYCODONE HYDROCHLORIDE PRN MG: 5 CAPSULE ORAL at 04:54

## 2018-10-05 RX ADMIN — OXYCODONE HYDROCHLORIDE AND ACETAMINOPHEN PRN TAB: 5; 325 TABLET ORAL at 17:02

## 2018-10-05 RX ADMIN — MORPHINE SULFATE PRN MG: 4 INJECTION INTRAVENOUS at 23:02

## 2018-10-05 RX ADMIN — CEFAZOLIN SODIUM SCH MLS/HR: 1 SOLUTION INTRAVENOUS at 06:14

## 2018-10-05 RX ADMIN — OXYCODONE HYDROCHLORIDE PRN MG: 5 CAPSULE ORAL at 00:28

## 2018-10-05 RX ADMIN — ENOXAPARIN SODIUM SCH MG: 40 INJECTION SUBCUTANEOUS at 12:40

## 2018-10-05 RX ADMIN — TRAMADOL HYDROCHLORIDE PRN MG: 50 TABLET, FILM COATED ORAL at 15:01

## 2018-10-05 RX ADMIN — DOCUSATE SODIUM SCH MG: 100 CAPSULE, LIQUID FILLED ORAL at 10:44

## 2018-10-05 RX ADMIN — ACETAMINOPHEN SCH: 325 TABLET ORAL at 05:35

## 2018-10-05 RX ADMIN — CEFAZOLIN SODIUM SCH MLS/HR: 1 SOLUTION INTRAVENOUS at 14:15

## 2018-10-05 RX ADMIN — FENOFIBRATE SCH: 48 TABLET ORAL at 10:48

## 2018-10-05 RX ADMIN — MAGNESIUM HYDROXIDE SCH: 400 SUSPENSION ORAL at 19:35

## 2018-10-05 RX ADMIN — OXYCODONE HYDROCHLORIDE PRN MG: 5 CAPSULE ORAL at 19:35

## 2018-10-05 RX ADMIN — OXYCODONE HYDROCHLORIDE AND ACETAMINOPHEN PRN TAB: 5; 325 TABLET ORAL at 10:43

## 2018-10-05 RX ADMIN — MORPHINE SULFATE PRN MG: 4 INJECTION INTRAVENOUS at 02:23

## 2018-10-05 RX ADMIN — OMEPRAZOLE SCH: 20 CAPSULE, DELAYED RELEASE ORAL at 08:37

## 2018-10-05 RX ADMIN — TRAMADOL HYDROCHLORIDE PRN MG: 50 TABLET, FILM COATED ORAL at 05:44

## 2018-10-05 RX ADMIN — ACETAMINOPHEN SCH: 325 TABLET ORAL at 12:32

## 2018-10-05 RX ADMIN — ACETAMINOPHEN SCH: 325 TABLET ORAL at 21:31

## 2018-10-05 RX ADMIN — CEFAZOLIN SODIUM SCH: 1 SOLUTION INTRAVENOUS at 01:39

## 2018-10-05 RX ADMIN — OXYCODONE HYDROCHLORIDE PRN MG: 5 CAPSULE ORAL at 12:39

## 2018-10-05 NOTE — OP
DATE OF OPERATION:  10/04/18 - ROOM #[id number]

 

DATE OF BIRTH:  [birth year]

 

ATTENDING SURGEON:  Randi Fay MD

 

ASSISTANT:  BRIANNA De La Vega.  Ms. Osorio did help throughout the procedure 
with preparation of the leg, wound retraction, manipulation of the knee, and 
wound closure.

 

ANESTHESIOLOGIST:  Dr. Alvarez.

 

ANESTHESIA:  Spinal.

 

PRE-OP DIAGNOSIS:  Severe end-stage degenerative osteoarthritis of the left 
knee joint.

 

POST-OP DIAGNOSIS:  Severe end-stage degenerative osteoarthritis of the left 
knee joint.

 

OPERATIVE PROCEDURE:  Left total knee arthroplasty.

 

BRIEF HISTORY/INDICATION:  Ms. Mahmood is a 73-year-old female with years of 
increasingly severe left knee pain.  Due to continued pain and decreased 
quality of life, she elected to undergo left total knee arthroplasty.  
Radiographs showed severe bone-on-bone arthritis.  She failed conservative 
treatment with anti- inflammatories, pain medication, intraarticular injection, 
and physical therapy. Informed consent was obtained from the patient.  She 
understood the risks of surgery included, but were not limited to, bleeding, 
infection, damage to nearby structures, continued pain, need for further surgery
, intraoperative fracture, nerve palsy, hardware failure or loosening, knee 
stiffness, loss of motion, stroke, heart attack, blood clot, and death.  She 
wished to proceed.

 

INTRAOPERATIVE FINDINGS:  Intraoperatively, the patient had severe end-stage 
arthritis with complete loss of cartilage in all 3 compartments.  She had 
extensive osteophyte formation.  She was noted to have significant osteopenia.

 

TOURNIQUET TIME:  47 minutes.

 

COMPLICATIONS:  None.

 

ESTIMATED BLOOD LOSS:  200 cc.

 

SPECIMENS:  Bone and cartilage from the left knee joint sent to Pathology.

 

HARDWARE USED:  This is Smith and Nephew cemented total knee arthroplasty 
hardware. Two packages of Simplex cement were used.  For the femur, a left size 
5 posterior stabilized Legion narrow femoral component.  For the tibia, a size 
4 left tibial base plate Bettina II.  For the insert, a 9 mm posterior 
stabilized articular insert size 3-4 and for the patella, a 32 mm 3-peg all 
poly patella.

 

DESCRIPTION OF PROCEDURE:  Ms. Mahmood was identified in the preanesthesia unit.  
Her left lower extremity was marked as the correct operative side.  Informed 
consent was signed and placed in the chart.  The patient was taken to the 
operating room and placed under spinal anesthesia.  A Gomez catheter was 
placed.  Tourniquet was placed on the left thigh.  Left lower extremity was 
prepped and draped in the usual sterile fashion.  Preop time-out was made to 
correctly identify the patient's side and site.  Appropriate perioperative 
antibiotics were given within 1 hour of incision.  Tourniquet was inflated and 
total tourniquet time for this procedure was 47 minutes.  A midline incision 
was made with a 10-blade and carried down to the extensor mechanism.  A new 10-
blade was used to make a standard medial parapatellar arthrotomy.  The patella 
was subluxed laterally.  Electrocautery was used to subperiosteally elevate 
soft tissue off the superomedial tibia to the mid sagittal plane.  Knee was 
flexed up.  Anterior horn of the lateral meniscus and ACL were sharply 
released.  A drill was used to enter the distal femur.  Intramedullary distal 
femoral cutting guide was pinned on the distal femur.  Oscillating saw was used 
to make the distal femoral cut.  Next, the external rotation guide was pinned 
on the distal femur.  Distal femur was sized to a size 5.  Size 5 multi-cutting 
jig was pinned on the distal femur.  Oscillating saw was used to make the 
appropriate 4 chamfer cuts.

 

The PCL was completely released.  The tibia was subluxed anteriorly. 
Extramedullary tibial cutting guide was pinned on the proximal tibia.  The 
oscillating saw was used to make a proximal tibial cut perpendicular to the 
mechanical access of the tibia.  The bone was carefully removed.  The knee was 
brought out into full extension.  Spacer block had good fit with the knee in 
full extension.  Medial and lateral ligaments were well balanced.  Flexion and 
extension gaps were well balanced.  The knee was flexed up.  Lamina  
was placed both medially and laterally.  Any remaining meniscus was removed 
using electrocautery. Curved osteotome was used to remove any posterior 
osteophytes.  Tibial tray and drop mark were placed and confirmed a satisfactory 
tibial cut.

 

A left size 5 femoral trial was impacted onto the distal femur.  The box for 
the posterior stabilized implant was prepared using a reamer and box cut 
osteotome. Size 4 tibial tray trial with a 9 mm insert trial was placed and the 
knee was taken through a range of motion.  The knee had full extension to 130 
degrees of flexion. There was satisfactory patellofemoral tracking.

 

The patella was everted.  A 9 mm of patellar bone and cartilage was carefully 
removed using the oscillating saw.  Patella was sized to a size 32.  Three peg 
holes were drilled through the size 32 guide.  A 32 trial patella was placed 
and the knee was taken through a range of motion.  There was satisfactory 
patellofemoral tracking.

 

All trials were carefully removed.  The tibia was subluxed anteriorly and sized 
to a size 4.  Size 4 proximal tibia was prepared using a size 4 keel punch.  
All bony cut surfaces were copiously irrigated with sterile saline and dried.  
Final implants were cemented into place starting with the tibia followed by the 
femur and last the patella.  A 9 mm insert trial was placed while the knee was 
brought into full extension.  Tourniquet was turned down at 47 minutes.  
Electrocautery was used to obtain meticulous hemostasis.  The knee was 
copiously irrigated with sterile saline.  Once the cement had fully cured, the 
insert trial was removed.  Any excess cement was removed from around the 
capsule and hardware.  Final insert chosen was a 9 mm posterior stabilized 
articular insert size 3-4.  This was locked into position on the tibial tray.  
Stability of the insert was checked and rechecked and noted to be stable.

 

The knee was copiously irrigated with sterile saline.  The extensor mechanism 
was closed using interrupted #1 Vicryls.  The rest of the incision was closed 
in a layered fashion using 0 and 2-0 Vicryls.  Skin was closed using running 3-
0 nylon suture.  Sterile Xeroform, 4x4s, and Webril were used to cover the 
incision.  Ace wrap and cold pack were placed over this.

 

The patient's anesthesia was reversed without difficulty.  She was taken to the 
PACU in stable condition.  Intended weightbearing will be weightbearing as 
tolerated.  Intended DVT prophylaxis will be Coumadin with a Lovenox bridge.

 

 [id number]/CPS #: [id number]

[facility] CenterDORINA

## 2018-10-05 NOTE — PN
Progress Note





- Progress Note


Date of Service: 10/05/18


SOAP: 





Subjective:


72 y/o female s/p L TKA by Dr Fay 10/4/2018.   Patient overall feeling well, 

pain controlled with pain medication, worked with PT well.  NO complaints or 

questions.   VSS, afebrile overnight.  





Objective:


General- Well appearing, NAD, AO resting comfortably in chair  


MSK- L LE- DF/PF = b/l, PT 2+, negative homans sign, surgical dressing intact, 

no drainage, no erythema.  SITLT.  








 Vital Signs











Temp  98.6 F   10/05/18 04:04


 


Pulse  76   10/05/18 04:04


 


Resp  18   10/05/18 10:45


 


BP  186/58   10/05/18 04:04


 


Pulse Ox  94   10/05/18 04:04








 Intake & Output











 10/04/18 10/05/18 10/05/18





 18:59 06:59 18:59


 


Intake Total 1900 1985 220


 


Output Total 500 975 


 


Balance 1400 1010 220


 


Weight 109.769 kg  


 


Intake:   


 


  IV Fluids 1800 1035 


 


    ABX - CEFAZOLIN  55 


 


    LR 1700 980 


 


    NS 100ML, Cefazolin 2G 100  


 


  Oral 100 950 220


 


Output:   


 


  Gomez 500 975 








 





Assessment:


Stable 72 y/o female s/p L TKA by Dr Fay 10/4/2018.








Plan:


- DVT prophylaxis- lovenox, coumadin 5mg tonight. 


- Continue PT/ OT 


- Follow up with Dr. Fay within 10-14 days 


- H&H - stable 


- post-op IV ABX - completed. 


- Possible D/C to home tomorrow. 


 








Acetaminophen (Tylenol Tab*)  975 mg PO Q8H [facility]


   Last Admin: 10/05/18 05:35 Dose:  Not Given


Bisacodyl (Dulcolax Supp*)  10 mg [facility] DAILY PRN


   PRN Reason: constipation


Cyclobenzaprine HCl (Flexeril Tab*)  10 mg PO TID PRN


   PRN Reason: SPASMS


   Last Admin: 10/04/18 20:11 Dose:  10 mg


Diphenhydramine HCl (Benadryl Iv*)  12.5 mg IV Q6H PRN


   PRN Reason: PRURITIS


Docusate Sodium (Colace Cap*)  100 mg PO BID [facility]


   Last Admin: 10/05/18 10:44 Dose:  100 mg


Enoxaparin Sodium (Lovenox(*))  40 mg SUBCUT Q24H [facility]


Fenofibrate (Tricor(Nf))  48 mg PO QAM [facility]


   Last Admin: 10/05/18 10:48 Dose:  Not Given


Lactated Ringer's (Lactated Ringers 1000 Ml Bag*)  1,000 mls @ 100 mls/hr IV 

PER RATE [facility]


   Last Admin: 10/05/18 02:10 Dose:  100 mls/hr


Cefazolin Sodium/Dextrose (Kefzol 1 Gm In Dextrose Duplex (*))  1 gm in 50 mls 

@ 200 mls/hr IVPB 0600,1400,2200 [facility]


   Stop: 10/05/18 14:14


   Last Admin: 10/05/18 06:14 Dose:  200 mls/hr


Lactulose (Lactulose*)  30 ml PO Q6H PRN


   PRN Reason: constipation


Magnesium Hydroxide (Milk Of Magnesia Liq*)  30 ml PO BID [facility]


   Last Admin: 10/05/18 10:44 Dose:  30 ml


Magnesium Hydroxide (Milk Of Magnesia Liq*)  30 ml PO Q6H PRN


   PRN Reason: constipation


Morphine Sulfate (Morphine Vial*)  2 mg IV Q2H PRN


   PRN Reason: PAIN - SEVERE


   Last Admin: 10/05/18 02:23 Dose:  2 mg


Omeprazole (Prilosec Cap*)  20 mg PO DAILY@0730 [facility]; Protocol


   Last Admin: 10/05/18 08:37 Dose:  Not Given


Ondansetron HCl (Zofran Odt Tab*)  4 mg PO Q6H PRN


   PRN Reason: NAUSEA


Ondansetron HCl (Zofran Inj*)  4 mg IV Q6H PRN


   PRN Reason: NAUSEA


Oxycodone HCl (Roxycodone Tab*)  10 mg PO Q4H PRN


   PRN Reason: SEVERE PAIN


   Last Admin: 10/05/18 04:54 Dose:  10 mg


Oxycodone/Acetaminophen (Percocet 5/325 Tab*)  1 tab PO Q4H PRN


   PRN Reason: PAIN


   Last Admin: 10/04/18 16:24 Dose:  1 tab


Oxycodone/Acetaminophen (Percocet 5/325 Tab*)  2 tab PO Q4H PRN


   PRN Reason: PAIN


   Last Admin: 10/05/18 10:43 Dose:  2 tab


Polyethylene Glycol/Electrolytes (Miralax*)  17 gm PO DAILY PRN


   PRN Reason: Constipation


Tramadol HCl (Ultram*)  50 mg PO Q6H PRN


   PRN Reason: PAIN


   Last Admin: 10/05/18 05:44 Dose:  50 mg


Warfarin Sodium (Coumadin Tab(*))  5 mg PO ONCE@1700 [facility]; Protocol


   Stop: 10/05/18 17:01

## 2018-10-06 LAB
HCT VFR BLD AUTO: 32 % (ref 35–47)
HGB BLD-MCNC: 11 G/DL (ref 12–16)
INR PPP/BLD: 1.55 (ref 0.77–1.02)
PLATELET # BLD AUTO: 236 10^3/UL (ref 150–450)

## 2018-10-06 RX ADMIN — OXYCODONE HYDROCHLORIDE AND ACETAMINOPHEN PRN TAB: 5; 325 TABLET ORAL at 04:42

## 2018-10-06 RX ADMIN — TRAMADOL HYDROCHLORIDE PRN MG: 50 TABLET, FILM COATED ORAL at 09:27

## 2018-10-06 RX ADMIN — DOCUSATE SODIUM SCH MG: 100 CAPSULE, LIQUID FILLED ORAL at 20:20

## 2018-10-06 RX ADMIN — ACETAMINOPHEN SCH: 325 TABLET ORAL at 05:12

## 2018-10-06 RX ADMIN — OXYCODONE HYDROCHLORIDE AND ACETAMINOPHEN PRN TAB: 5; 325 TABLET ORAL at 16:06

## 2018-10-06 RX ADMIN — OXYCODONE HYDROCHLORIDE PRN MG: 5 CAPSULE ORAL at 23:28

## 2018-10-06 RX ADMIN — OXYCODONE HYDROCHLORIDE AND ACETAMINOPHEN PRN TAB: 5; 325 TABLET ORAL at 20:19

## 2018-10-06 RX ADMIN — ACETAMINOPHEN SCH: 325 TABLET ORAL at 13:31

## 2018-10-06 RX ADMIN — OXYCODONE HYDROCHLORIDE PRN MG: 5 CAPSULE ORAL at 08:14

## 2018-10-06 RX ADMIN — DOCUSATE SODIUM SCH MG: 100 CAPSULE, LIQUID FILLED ORAL at 08:14

## 2018-10-06 RX ADMIN — OXYCODONE HYDROCHLORIDE PRN MG: 5 CAPSULE ORAL at 01:02

## 2018-10-06 RX ADMIN — ACETAMINOPHEN SCH: 325 TABLET ORAL at 20:56

## 2018-10-06 RX ADMIN — FENOFIBRATE SCH: 48 TABLET ORAL at 08:27

## 2018-10-06 RX ADMIN — OMEPRAZOLE SCH MG: 20 CAPSULE, DELAYED RELEASE ORAL at 08:14

## 2018-10-06 RX ADMIN — ENOXAPARIN SODIUM SCH MG: 40 INJECTION SUBCUTANEOUS at 11:52

## 2018-10-06 RX ADMIN — OXYCODONE HYDROCHLORIDE PRN MG: 5 CAPSULE ORAL at 11:50

## 2018-10-06 NOTE — PN
Progress Note





- Progress Note


Date of Service: 10/06/18


SOAP: 


Subjective:


Pt. is alert, pain is moderate today.  








Objective:


Vital Signs:











Temp Pulse Resp BP Pulse Ox


 


 98.4 F   79   18   152/53   95 


 


 10/06/18 07:54  10/06/18 07:54  10/06/18 08:27  10/06/18 07:54  10/06/18 07:54








 Laboratory Results - last 24 hr











  10/06/18 10/06/18





  05:41 05:41


 


Hgb  11.0 L 


 


Hct  32 L 


 


Plt Count  236 


 


MPV  8.0 


 


INR (Anticoag Therapy)   1.55 H








LLE - dressing changed, inc c/d/i  with mod hematoma.  distally nvi.  








Assessment:


72 yo F pod 2 s/p LTKA








Plan:


wbat 


pt/ot


lovenox and coumadin


plan d/c to home 10/7 with vns

## 2018-10-07 VITALS — SYSTOLIC BLOOD PRESSURE: 145 MMHG | DIASTOLIC BLOOD PRESSURE: 57 MMHG

## 2018-10-07 LAB
HCT VFR BLD AUTO: 31 % (ref 35–47)
HGB BLD-MCNC: 10.8 G/DL (ref 12–16)
INR PPP/BLD: 1.93 (ref 0.77–1.02)
PLATELET # BLD AUTO: 232 10^3/UL (ref 150–450)

## 2018-10-07 RX ADMIN — ACETAMINOPHEN SCH: 325 TABLET ORAL at 05:06

## 2018-10-07 RX ADMIN — DOCUSATE SODIUM SCH MG: 100 CAPSULE, LIQUID FILLED ORAL at 08:01

## 2018-10-07 RX ADMIN — OXYCODONE HYDROCHLORIDE AND ACETAMINOPHEN PRN TAB: 5; 325 TABLET ORAL at 12:22

## 2018-10-07 RX ADMIN — OMEPRAZOLE SCH MG: 20 CAPSULE, DELAYED RELEASE ORAL at 08:01

## 2018-10-07 RX ADMIN — FENOFIBRATE SCH: 48 TABLET ORAL at 08:04

## 2018-10-07 RX ADMIN — OXYCODONE HYDROCHLORIDE AND ACETAMINOPHEN PRN TAB: 5; 325 TABLET ORAL at 08:08

## 2018-10-07 RX ADMIN — OXYCODONE HYDROCHLORIDE AND ACETAMINOPHEN PRN TAB: 5; 325 TABLET ORAL at 03:49

## 2018-10-07 NOTE — PN
Progress Note





- Progress Note


Date of Service: 10/07/18


SOAP: 


Subjective:


[Pt. seen lying in bed this am. States that she is feeling much improved. Pain 

is well managed. Would like to go home. Denies any chest pain, SOB, numbness or 

tingling.  








Objective:


 Vital Signs











Temp  98.0 F   10/07/18 07:15


 


Pulse  77   10/07/18 07:15


 


Resp  16   10/07/18 08:08


 


BP  145/57   10/07/18 07:15


 


Pulse Ox  96   10/07/18 07:15








 Intake & Output











 10/06/18 10/07/18 10/07/18





 18:59 06:59 18:59


 


Intake Total 540 200 


 


Output Total 650 500 


 


Balance -110 -300 


 


Intake:   


 


  Oral 540 200 


 


Output:   


 


  Urine 650 500 


 


Other:   


 


  Estimated Void  Medium 


 


  # Voids  1 








General: A&Ox3, NAD 


LLE - dressing c/d/i. +df/pf, calf soft and non tender  distally nvi.  








Assessment:


74 yo F pod 3 s/p LTKA








Plan:


wbat 


pt/ot


INR 1.93 - Dosing is 6mg tonight


plan d/c to home today with vns

## 2018-10-08 NOTE — DS
DISCHARGE SUMMARY:

 

DATE OF ADMISSION:  10/04/18

 

DATE OF DISCHARGE:  10/07/18

 

PROVIDER:  Randi Fay MD * (BRIANNA Perla dictating)

 

ADMITTING DIAGNOSIS:  Left total knee arthroplasty.

 

CONSULTATIONS:  PT/OT and Hospitalist Medicine.

 

HISTORY OF PRESENT ILLNESS:  Ms. Mahmood is a 73-year-old female with continued 
complaints of left knee pain.  She had failed conservative management and 
elected to proceed with a left total knee arthroplasty which was performed on 10
/04/18.

 

HOSPITAL COURSE:  The patient was admitted to [facility] on 10/04/18 
and underwent a left total knee arthroplasty with no complications.  The 
patient recovered briefly in the postanesthesia care unit and was transferred 
to the short- stay surgical unit in stable condition.  On postop day #1, the 
patient's H and H was 12.2 and 35, INR was 1.09 after 6 mg Coumadin the night 
before.  Dressing was clean, dry, and intact.  Left lower extremity was 
neurovascularly intact.  She could demonstrate dorsiflexion and plantarflexion 
with good strength.  The patient was able to get out of bed with physical 
therapy.  The pain was well controlled with oral pain medication.  On 
postoperative day #2, the urinary catheter was discontinued and the patient was 
able to void without difficulty. Incision was found to be benign with minimal 
drainage.  No erythema or warmth.  The patient's H and H was 11.0 and 32, INR 
was 1.55 after 5 mg of Coumadin the night before.  The pain was controlled with 
oral pain medication.  The patient was able to ambulate with the use of a 
rolling walker and assistance.  On postop day #3, the patient's H and H was 
10.8 and 31.  INR was 1.93 after 6 mg of Coumadin the night before.  The patient
's pain was well controlled and found to be stable for discharge. Throughout 
the hospital course, the vital signs remained stable and the patient was 
afebrile.

 

DISCHARGE CONDITION:  Good.

 

DISCHARGE MEDICATIONS:

1.  Percocet 5/325.

2.  Warfarin 2 mg.

 

HOME MEDICATIONS:

1.  Nexium 40 mg.

2.  Tricor 145.

3.  Phentermine 37.5.

 

DISCHARGE INSTRUCTIONS:

1.  Weightbearing as tolerated.

2.  Continue physical therapy and occupational therapy exercises as shown.  
Wound care:  Okay to shower.  No bathing, swimming or submerging the wound.  
Use gentle soap and pat dry.  Cover with antibiotic ointment, gauze, Ace wrap.  
Call orthopedic office for increased drainage, redness, increased pain or 
fever.  Go to the ER with shortness of breath or chest pain.

3.  Diet:  Regular diet.  Increase fluids and fiber to prevent constipation. 
Continue to use stool softeners.  Call office if no bowel motion within 48 
hours.

4.  Visiting home nurse to remove staples in 10 to 12 days and do wound checks.

5.  Coumadin 6 mg tonight, recheck tomorrow.

6.  Pain control, Percocet 5/325 take 1 to 2 tabs every 4 to 6 hours for pain 
control as needed.

7.  Antibiotic required prior to any dental work.

8.  Follow up with Dr. Fay within 10 to 14 days, sooner if you have any 
concerns.

 

____________________________________ BRIANNA PERLA

 

[id number]/CPS #: [id number]

MAURICE

## 2019-03-28 ENCOUNTER — HOSPITAL ENCOUNTER (EMERGENCY)
Dept: HOSPITAL 25 - UCEAST | Age: 74
Discharge: HOME | End: 2019-03-28
Payer: MEDICARE

## 2019-03-28 VITALS — SYSTOLIC BLOOD PRESSURE: 145 MMHG | DIASTOLIC BLOOD PRESSURE: 70 MMHG

## 2019-03-28 DIAGNOSIS — E78.5: ICD-10-CM

## 2019-03-28 DIAGNOSIS — Z88.6: ICD-10-CM

## 2019-03-28 DIAGNOSIS — S52.502A: Primary | ICD-10-CM
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
## 2019-03-28 DIAGNOSIS — Z85.820: ICD-10-CM

## 2019-03-28 DIAGNOSIS — Z88.8: ICD-10-CM

## 2019-03-28 DIAGNOSIS — R03.0: ICD-10-CM

## 2019-03-28 DIAGNOSIS — M19.042: ICD-10-CM

## 2019-03-28 DIAGNOSIS — Z87.891: ICD-10-CM

## 2019-03-28 DIAGNOSIS — K21.9: ICD-10-CM

## 2019-03-28 PROCEDURE — G0463 HOSPITAL OUTPT CLINIC VISIT: HCPCS

## 2019-03-28 PROCEDURE — 99211 OFF/OP EST MAY X REQ PHY/QHP: CPT

## 2019-03-28 NOTE — XMS REPORT
Continuity of Care Document (CCD)

 Created on:2019



Patient:Samantha Olivarez

Sex:Female

:1945

External Reference #:[id number]





Demographics







 Address  [address] [place] [address]

 

 Home Phone  [phone]

 

 Mobile Phone  [phone]

 

 Email Address  [email or web address]

 

 Preferred Language  en

 

 Marital Status  Not  or 

 

 Christian Affiliation  Unknown

 

 Race  White

 

 Ethnic Group  Not  or 









Author







 Name  Jenni Arenas









Support







 Name  Relationship  Address  Phone

 

 Carlos Healy  Unavailable  Unavailable  [phone]









Care Team Providers







 Name  Role  Phone

 

 Sheryl Leigh MD  Primary Care Physician  Unavailable









Payers







 Date  Identification Numbers  Payment Provider  Subscriber

 

   Policy Number: [id number]  Medicare  Samantha Olivarez









 PayID: [id number]  [address]









 [place] [address]









   Policy Number: [id number]  [employer]  Samantha Olivarez









 PayID: [id number]  [address]









 [place] [address]









 Expires: 2018  Policy Number: [id number]  [facility]  Samantha Olivarez









 PayID: [id number]  [address]









 [place] [address]







Advance Directives







 Description

 

 No Information Available







Problems







 Date  Description  Provider  Status

 

 Onset: 2018  Localized, primary osteoarthritis  Randi TATIANA Fay  
Active







Family History







 Date  Family Member(s)  Observation  Comments

 

   General  No Current Problems  

 

   Siblings  1  







Social History







 Type  Date  Description  Comments

 

 Birth Sex    Unknown  

 

 Marital Status      

 

 Lives With    Alone  

 

 Occupation    Retired  [employer]

 

 ETOH Use    Rarely consumes alcohol  

 

 Tobacco Use  Start: Unknown End:  Patient is a former smoker  quit 



   Unknown    

 

 Recreational Drug Use    Denies Drug Use  

 

 Smoking Status  Reviewed: 19  Patient is a former smoker  quit 

 

 Exercise Type/Frequency    Exercises sporadically  







Allergies, Adverse Reactions, Alerts







 Date  Description  Reaction  Status  Severity  Comments

 

 2017  Ibuprofen    Active    

 

 2018  Strawberries    Active    







Medications







 Medication  Date  Status  Form  Strength  Qnty  SIG  Indications  Ordering



                 Provider

 

 Percocet  10/07/  Active  Tablets  5-325mg  60tabs  1 - 2 tabs    Randi



   2018          by mouth    Sumeet,



             every 4 -    M.D.



             6 hours as    



             needed for    



             pain.    

 

 Nexium  00/  Active  Capsules  40mg    1 by mouth    Unknown



   0000    DR      every day    

 

 Tricor  /  Active  Tablets  145mg    1 by mouth    Unknown



   0000          every day    

 

                 

 

 Zofran  10/11/  Hx  Tablets  4mg  30tabs  Take 1 tab    Randi



   2018 -          po q 6    Sumeet,



   12/          hours prn    M.D.



   2018          nausea    

 

 Warfarin  10/07/  Hx  Tablets  2mg  90tabs  take 1-5    Randi



 Sodium  2018 -          tablets as    Sumeet,



             directed    M.D.



   2018          daily.    

 

 Hydrocodone  /  Hx  Tablets  7.5-300mg    1 tab by    Unknown



 Bitartrate/Ace  0000 -          mouth    



 taminophen  /          every 8-12    



   2018          hours as    



             needed    

 

 Phentermine  /  Hx  Tablets  37.5mg        Crepet,



 HCL  0000 -              MD Sheryl



   2018              







Medications Administered in Office







 Medication  Date  Status  Form  Strength  Qnty  SIG  Indications  Ordering



                 Provider

 

 Depomedrol    Administered  Injection          Alethea



 40MG  017              TATIANA Santos







Immunizations







 Description

 

 No Information Available







Vital Signs







 Date  Vital  Result  Comment

 

 2019  2:59pm  Height  63 inches  5'3"









 Weight  242.00 lb  

 

 BP Systolic Sitting  140 mmHg  

 

 BP Diastolic Sitting  80 mmHg  

 

 Pain Level  0  

 

 BMI (Body Mass Index)  42.9 kg/m2  









 2019 12:55pm  Height  63 inches  5'3"









 Weight  242.00 lb  

 

 BP Systolic Sitting  130 mmHg  

 

 BP Diastolic Sitting  80 mmHg  

 

 Pain Level  8  

 

 BMI (Body Mass Index)  42.9 kg/m2  









 2018 11:12am  Height  63 inches  5'3"









 Weight  242.00 lb  

 

 BP Systolic Sitting  122 mmHg  

 

 BP Diastolic Sitting  78 mmHg  

 

 Pain Level  6  

 

 BMI (Body Mass Index)  42.9 kg/m2  









 2018 10:05am  Height  63 inches  5'3"









 Weight  242.00 lb  

 

 BP Systolic  128 mmHg  

 

 BP Diastolic  68 mmHg  

 

 Body Temperature  98.1 F  

 

 Pain Level  0  

 

 BMI (Body Mass Index)  42.9 kg/m2  









 2018 10:33am  Height  63 inches  5'3"









 Weight  242.00 lb  

 

 BP Systolic  132 mmHg  

 

 BP Diastolic  60 mmHg  

 

 Body Temperature  97.8 F  

 

 Pain Level  2  

 

 BMI (Body Mass Index)  42.9 kg/m2  









 10/17/2018 11:07am  Height  63 inches  5'3"









 Weight  242.00 lb  

 

 BP Systolic  138 mmHg  

 

 BP Diastolic  78 mmHg  

 

 Body Temperature  97.7 F  

 

 BMI (Body Mass Index)  42.9 kg/m2  









 2018  8:55am  Height  62.5 inches  5'2.50"









 Weight  241.00 lb  

 

 BP Systolic  124 mmHg  

 

 BP Diastolic  72 mmHg  

 

 Respiratory Rate  15 /min  

 

 Pain Level  6  

 

 BMI (Body Mass Index)  43.4 kg/m2  









 2018 10:30am  Height  62.5 inches  5'2.50"









 Weight  242.00 lb  

 

 BP Systolic  127 mmHg  

 

 BP Diastolic  74 mmHg  

 

 Respiratory Rate  16 /min  

 

 Pain Level  7  

 

 BMI (Body Mass Index)  43.6 kg/m2  









 2017 10:00am  Height  62.5 inches  5'2.50"









 Weight  226.00 lb  

 

 Heart Rate  80 /min  

 

 BP Systolic Sitting  142 mmHg  

 

 BP Diastolic Sitting  70 mmHg  

 

 Respiratory Rate  14 /min  

 

 Body Temperature  98.2 F  

 

 Pain Level  8  

 

 BMI (Body Mass Index)  40.7 kg/m2  







Results







 Test  Date  Facility  Test  Result  H/L  Range  Note

 

 Inr/Protime  10/29/2018  [facility]  Inr  2.93  High  0.77-1.02  



     [address]          



     [place] [address] [phone]          

 

 CBC Auto Diff  2018  [facility]  White Blood  7.8 10^3/uL  N  
3.5-10.8  



     [address] [place] [address] [phone]          









 Red Blood Count  4.05 10^6/uL  N  4.00-5.40  

 

 Hemoglobin  13.3 g/dL  N  12.0-16.0  

 

 Hematocrit  40 %  N  35-47  

 

 Mean Corpuscular Volume  98 fL  High  80-97  

 

 Mean Corpuscular Hemoglobin  33 pg  High  27-31  

 

 Mean Corpuscular HGB Conc  34 g/dL  N  31-36  

 

 Red Cell Distribution Width  13 %  N  10.5-15  

 

 Platelet Count  269 10^3/uL  N  150-450  

 

 Mean Platelet Volume  8.2 um3  N  7.4-10.4  

 

 Abs Neutrophils  5.1 10^3/uL  N  1.5-7.7  

 

 Abs Lymphocytes  2.0 10^3/uL  N  1.0-4.8  

 

 Abs Monocytes  0.5 10^3/uL  N  0-0.8  

 

 Abs Eosinophils  0.2 10^3/uL  N  0-0.6  

 

 Abs Basophils  0.1 10^3/uL  N  0-0.2  

 

 Abs Nucleated RBC  0 10^3/uL      

 

 Granulocyte %  65.1 %  N  38-83  

 

 Lymphocyte %  25.4 %  N  25-47  

 

 Monocyte %  6.6 %  N  0-7  

 

 Eosinophil %  2.0 %  N  0-6  

 

 Basophil %  0.9 %  N  0-2  

 

 Nucleated Red Blood Cells %  0.1      









 Inr/Protime  2018  [facility]  Inr  0.94  N  0.77-1.02  



     [address]          



     [place] [address]          



     [phone]          

 

 Laboratory test  2018  [facility]  Partial  31.9 seconds  N  
26.0-36.3  



 finding    [address]  [facility]  Thrombo Time        



     [place] [address]  PTT        



     [phone]          

 

 Comp Metabolic  2018  [facility]  Sodium  139 mmol/L  N  135-
145  



 Panel    [address]  [place] [address] [phone]          









 Potassium  3.9 mmol/L  N  3.5-5.0  

 

 Chloride  103 mmol/L  N  101-111  

 

 Co2 Carbon Dioxide  28 mmol/L  N  22-32  

 

 Anion Gap  8 mmol/L  N  2-11  

 

 Glucose  116 mg/dL  High    

 

 Blood Urea Nitrogen  26 mg/dL  High  6-24  

 

 Creatinine  1.03 mg/dL  High  0.51-0.95  

 

 BUN/Creatinine Ratio  25.2  High  8-20  

 

 Calcium  9.6 mg/dL  N  8.6-10.3  

 

 Total Protein  6.7 g/dL  N  6.4-8.9  

 

 Albumin  3.8 g/dL  N  3.2-5.2  

 

 Globulin  2.9 g/dL  N  2-4  

 

 Albumin/Globulin Ratio  1.3  N  1-3  

 

 Total Bilirubin  0.40 mg/dL  N  0.2-1.0  

 

 Alkaline Phosphatase  57 U/L  N    

 

 Alt  15 U/L  N  7-52  

 

 Ast  18 U/L  N  13-39  

 

 Egfr Non-  52.4    >60  

 

 Egfr   63.4    >60  1









 Type & Screen  2018  [facility]  Patient Blood Type  B 
Positive      



     [address]          



     [place] [address]          



     [phone]          









 Antibody Screen  NEGATIVE      









 Urinalysis Profile  2018  [facility]  Urine Color  Yellow    
  



     [address] [place] [address] [phone]          









 Urine Appearance  Clear      

 

 Urine Specific Gravity  1.035  High  1.010-1.030  

 

 Urine pH  5  N  5-9  

 

 Urine Urobilinogen  Negative    Negative  

 

 Urine Ketones  Negative    Negative  

 

 Urine Protein  Negative    Negative  

 

 Urine Leukocytes  2+  Abnormal  Negative  

 

 Urine Blood  Negative    Negative  

 

 Urine Nitrite  Negative    Negative  

 

 Urine Bilirubin  Negative    Negative  

 

 Urine Glucose  Negative    Negative  

 

 Urine White Blood Cell  3+(>20/hpf)  Abnormal  Absent  

 

 Urine Red Blood Cell  2+(6-10/hpf)  Abnormal  Absent  

 

 Urine Bacteria  Absent    Absent  

 

 Urine Squamous Epithelial Cell  Present  Abnormal  Absent  









 Urine Culture And  2018  [facility]  Urine Culture  SEE 
RESULT      2



 Sensitivities    [address]    [address]      



     [place] [address] [phone]          









 1  *******Because ethnic data is not always readily available,



   this report includes an eGFR for both -Americans and



   non- Americans.****



   The National Kidney Disease Education Program (NKDEP) does



   not endorse the use of the MDRD equation for patients that



   are not between the ages of 18 and 70, are pregnant, have



   extremes of body size, muscle mass, or nutritional status,



   or are non- or non-.



   According to the National Kidney Foundation, irrespective of



   diagnosis, the stage of the disease is based on the level of



   kidney function:



   Stage Description                      GFR(mL/min/1.73 m(2))



   1     Kidney damage with normal or decreased GFR       90



   2     Kidney damage with mild decrease in GFR          60-89



   3     Moderate decrease in GFR                         30-59



   4     Severe decrease in GFR                           15-29



   5     Kidney failure                       <15 (or dialysis)

 

 2  SEE RESULT BELOW



   -----------------------------------------------------------------------------
---------------



   Name:  SAMANTHA OLIVAREZ                     : 1945    Attend Dr: Randi Fay MD



   Acct:  [id number]  Unit: [id number]  AGE: 73            Location:  [facility]



   Re18                        SEX: F             Status:    REG REF



   -----------------------------------------------------------------------------
---------------



   



   SPEC: [id number]         ARSALAN:       [facility] DR: Randi Fay MD



   REQ:  [id number]              RECD:   



   STATUS: COMP



   _



   SOURCE: URINE          SPDESC:



   ORDERED:  Urine Culture



   QUERIES:  Urine Source: Clean Catch



   



   -----------------------------------------------------------------------------
---------------



   Procedure                         Result                         Reported   
        Site



   -----------------------------------------------------------------------------
---------------



   Urine Culture  Final                                             18-
1630      ML



   



   No growth of clinically significant organisms



   



   -----------------------------------------------------------------------------
---------------



   * ML - Main Lab



   .



   



   



   



   



   



   



   



   



   



   



   



   



   



   



   



   



   



   



   



   



   



   



   



   



   



   ** END OF REPORT **



   



   DEPARTMENT OF PATHOLOGY,  [address] [place] [address]



   Phone # [phone]      Fax #[phone]



   Ramón Troy M.D. Director     [facility] # [id number]







Procedures







 Date  Code  Description  Status

 

 10/04/2018  59925  TKR Total Knee Replacement  Completed

 

 10/04/2018  20375  TKR Total Knee Replacement  Completed

 

 2017  65898  Inject/Drain Joint/Bursa Small W/O US  Completed







Encounters







 Type  Date  Location  Provider  Dx  Diagnosis

 

 Office Visit  2019  [facility]  Mihaela Astudillo,  M48.062  Spinal 
stenosis,



   3:00p  Of Cma  PA-C    lumbar region with



           neurogenic



           claudication

 

 Office Visit  2019  Spine Navigator  Mihaela Astudillo  M54.12  Radiculopathy,



   1:00p  Of Cma  PA-C    cervical region









 M51.36  Other intervertebral disc degeneration, lumbar region









 Office Visit  2018 11:15a  [facility]igator Of  Mihaela Astudillo,  M54.5  Low 
back pain



     Cma  PA-C    









 M51.36  Other intervertebral disc degeneration, lumbar region









 Office Visit  2018 10:15a  Orthopedic Services  Randi Fay,  M25.562  
Pain in left



     Of [facility]  M.MACARIO    knee









 M25.462  Effusion, left knee

 

 M17.12  Unilateral primary osteoarthritis, left knee









 Office Visit  2017  Orthopedic  Alethea  M18.12  Unil primary



   9:30a  Services Of  TATIANA Santos    osteoarth of first



     C.M.A.      carpometacarp



           joint, l hand







Plan of Treatment

Future Appointment(s):2019  9:45 am - Randi Fay M.D. at [facility] 
[email or web address] - BRIANNA Méndez-CM48.062 Spinal stenosis, lumbar 
region with neurogenic claudicationFollow up:Patient to call if she would like 
referral for injections or follow up to discuss surgery

## 2019-03-28 NOTE — UC
Hand/Wrist HPI





- HPI Summary


HPI Summary: 





74 y/o female presents to the urgent care c/o left wrist pain s/p tripping and 

falling while getting out of her car yesterday afternoon.  Pain is 8/10 on the 

medial aspect of wrist associated w/ mild swelling and radiating to forearm .  

Pt took a Hydrocodone PO this morning to alleviate pain. Pt denies numbness and 

tingling sensation over her finger or hand , but it very painful to move her 

wrist or lift something.  Pt denies fever, SOB, chest pain, abdominal pain, N/V/

d. 








- History Of Current Complaint


Stated Complaint: LT WRTIST INJURY


Time Seen by Provider: 03/28/19 13:20


Hx Obtained From: Patient


Pregnant?: No - Menopausal


Onset/Duration: Sudden Onset, Lasting Days - 1 day, Still Present


Severity Initially: Moderate


Severity Currently: Moderate


Pain Intensity: 8


Pain Scale Used: 0-10 Numeric


Character Of Pain: Sharp


Aggravating Factor(s): Movement, Lifting


Alleviating Factor(s): Rest, OTC Meds


Associated Signs And Symptoms: Positive: Swelling.  Negative: Bruising, Fever, 

Weakness, Numbness/Tingling


Related History: Dominant Hand Right





- Allergies/Home Medications


Allergies/Adverse Reactions: 


 Allergies











Allergy/AdvReac Type Severity Reaction Status Date / Time


 


Adhesive Tape Allergy Severe See Comment Verified 03/28/19 13:16


 


ibuprofen Allergy Severe Swelling Verified 03/28/19 13:16





   Of  





   Face,Lips,&  





   Throat  


 


strawberry Allergy Severe Hives Verified 03/28/19 13:16


 


TAPE/BANDAIDS AdvReac Severe "TAKES MY Uncoded 03/28/19 13:16





   SKIN OFF"  














PMH/Surg Hx/FS Hx/Imm Hx


Previously Healthy: Yes


Endocrine History: Dyslipidemia


GI/ History: Gastroesophageal Reflux


Cancer History: Other - melanoma





- Surgical History


Surgical History: Yes


Surgery Procedure, Year, and Place: Bowel resection, APPENDECTOMY.  LEFT ARM 

MELANOMA REMOVED.  Right total KNEE replacement 3-4 years ago.  Left carpal 

tunnel surgery.  Bilateral cataract extraction with lens implant.  11/2017  Lt 

KNEE REPLACEMENT





- Family History


Known Family History: Positive: Cardiac Disease





- Social History


Occupation: Retired


Lives: With Family


Alcohol Use: None


Substance Use Type: None


Smoking Status (MU): Former Smoker


Type: Cigarettes


Length of Time of Smoking/Using Tobacco: 10 YRS


When Did the Patient Quit Smoking/Using Tobacco: 2002





- Immunization History


Most Recent Influenza Vaccination: 10/05/18


Most Recent Pneumonia Vaccination: 2 years ago





Review of Systems


All Other Systems Reviewed And Are Negative: Yes


Constitutional: Positive: Negative


Skin: Positive: Other


Eyes: Positive: Negative


ENT: Positive: Negative


Respiratory: Positive: Negative


Cardiovascular: Positive: Negative


Gastrointestinal: Positive: Negative


Genitourinary: Positive: Negative


Motor: Positive: Negative


Neurovascular: Positive: Negative


Musculoskeletal: Positive: Decreased ROM - left wrist, Other: - left wrist pain 

s/p fall


Neurological: Positive: Negative


Psychological: Positive: Negative


Is Patient Immunocompromised?: No





Physical Exam





- Summary


Physical Exam Summary: 





Vital Signs Reviewed: Yes


General: Well-Appearing, No Pain Distress, Well-Nourished female w/o any 

apparent distress


Eyes: Positive: Conjunctiva Clear - PERRLA, EOMI


ENT: Positive: Normal ENT inspection, Hearing grossly normal, Pharynx normal, 

TMs normal, Uvula midline


Neck: Positive: Supple, Nontender, No Lymphadenopathy


Respiratory: Positive: Chest non-tender, Lungs clear, Normal breath sounds, No 

respiratory distress


Cardiovascular: Positive: RRR, No Murmur, Pulses Normal, Brisk Capillary Refill


Abdomen Description: Positive: Nontender, No Organomegaly, Soft.  Negative: CVA 

Tenderness (R), CVA Tenderness (L)


Bowel Sounds: Positive: Present


Musculoskeletal: Positive: Strength Intact, Other:


Neurological Exam: Normal


Musculoskeletal: Positive: Wrist: the L wrist is without obvious asymmetry or 

deformity when compared to the R wrist.  No surface trauma, open wounds, mild 

soft tissue swelling on the medial aspect of the wrist, no obvious deformity.  

No overlying erythema or warmth.  No bony crepitus. Point tenderness over the  

thenar eminence and ventral side of wrist.  No scaphoid fullness or tenderness 

to direct palpation or axial load.  Decreased ROM due to pain.  Motor/sensory 

function of ulnar, radial, median nerves intact.  Ulnar and radial pulses 

intact. 


Psychological Exam: Normal


Skin Exam: Normal





Triage Information Reviewed: Yes





Hand/Wrist Course/Dx





- Course


Course Of Treatment: 





74 y/o female presents to the urgent care c/o left wrist pain s/p tripping and 

falling while getting out of her car yesterday afternoon.  Pain is 8/10 on the 

medial aspect of wrist associated w/ mild swelling and radiating to forearm .  

Pt took a Hydrocodone PO this morning to alleviate pain. Pt denies numbness and 

tingling sensation over her finger or hand , but it very painful to move her 

wrist or lift something.  Pt denies fever, SOB, chest pain, abdominal pain, N/V/

d. Hx obtained.


LF wrist X-ray ordered. Impression:Questionable non displaced fracture 

involving the left distal radius and advanced degenerative changes of the 

metacarpal bones. Pts wrist immobilized with  thumb spica splint. Advised RICE

: Rest, Ice, elevation, Ibuprofen PO. There was no neurovascular compromise 

after splint application placed by nurse; the splint was in good alignment and 

the pt had good sensation and capillary refill at the time of discharge.Pt 

advised to f/u w/ Orthopedic DR Vieira for further evaluation and treatment on 

possible fracture.Pt's BP is elevated today advised to decrease salt in diet, 

monitor BP and f/u with PCP for further management.D/C instructions explained. 

Pt understood and agreed w/ plan of care.











- Differential Dx/Diagnosis


Differential Diagnosis/HQI/PQRI: Contusion, Dislocation, Fracture, Sprain, 

Strain, Tendonitis, Tenosynovitis


Provider Diagnosis: 


 Left wrist injury, Osteoarthritis of left hand, Left wrist fracture, Elevated 

BP without diagnosis of hypertension








Discharge





- Sign-Out/Discharge


Documenting (check all that apply): Patient Departure - d/c home


All imaging exams completed and their final reports reviewed: Yes





- Discharge Plan


Condition: Stable


Disposition: HOME


Prescriptions: 


HYDROcodone/ACETAMIN 5-325 MG* [Norco 5-325 TAB*] 1 tab PO Q6H PRN #12 tab MDD 

1g/4hr-4g/day


 PRN Reason: Pain


Patient Education Materials:  Wrist Fracture in Adults (ED), Osteoarthritis (ED)


Referrals: 


Sheryl Leigh MD [Primary Care Provider] - 3 Days


German Vieira MD [Medical Doctor] - 1 Day


Additional Instructions: 


1-Please take medications as directed to alleviate pain and swelling.


2-Please apply ice, keep your wrist immobilized with the splint. Avoid heavy 

lifting. 


3- Please f/u with Orthopedic Dr Vieira in 1-2 days for further evaluation and 

treatment on your possible radial fracture.


4- Your BP is elevated today. please decrease salt in your diet, monitor BP and 

if it continues to be elevated please f/u with your PCP for further management.














- Billing Disposition and Condition


Condition: STABLE


Disposition: Home